# Patient Record
(demographics unavailable — no encounter records)

---

## 2017-09-13 NOTE — ED PDOC
HPI: Chest Pain


History Per: Patient (Patient is a 59 yo male with no significant PMHX 

presenting to ED with complaints of chest pain for the past month. He states 

that was recently admitted to [facility] 4 weeks ago for chest 

pain in which he was ultimately sent to the cath lab that revealed no 

abnormalities. He did not have any stents or cardiac procedures during his stay 

and was discharged on metropolol. Since then, his chest pain has persisted. He 

localizes the pain to his left anterior chest wall, non radiating, pressure-

like but sometimes sharp. Pain is sometimes brought on by physical activity but 

he also experiences the pain at rest as well. Pain is allleviated by aspirin. 4 

days ago, he states that while driving he experienced an episode of extreme 

chest pain associated with palpatations, dysarthria,  and numbness of his left 

arm and generalized weakness that resolved spontaneously within 15 minutes. 

When asked, he reports feeling very anxious and as per his daughter, he has a 

lot of stress in his life. He denies fever, cough, sob, LE swelling, dysuria, n/

v/d, abdominal pain, or weakness in extremitities. ), Family





<Ryan Willams - Last Filed: 09/13/17 17:49>





<Wade Aldana - Last Filed: 09/13/17 18:30>


Time Seen by Provider: 09/13/17 15:28


Chief Complaint (Nursing): Chest Pain





Supervising Attending Note





- Supervising Attending Note


The Documented history was done by the: Physician Extender, Attending Physician


The documented physical exam was done by the: Physician Extender, Attending 

Physician


The documented procedures were done by the: Physician Extender, Attending 

Physician





- Attestation:


I have personally seen and examined this patient.: Yes


I have fully participated in the care of the patient.: Yes


I have reviewed all pertinent clinical information: Yes





<Wade Aldana - Last Filed: 09/13/17 18:30>





Past Medical History





- Medical History


PMH: HTN





- Surgical History


Surgical History: Cholecystectomy





- Family History


Family History: States: Unknown Family Hx





- Living Arrangements


Living Arrangements: With Family





- Social History


Current smoker - smoking cessation education provided: No


Alcohol: None


Drugs: Denies





<Ryan Willams - Last Filed: 09/13/17 17:49>





<Wade Aldana - Last Filed: 09/13/17 18:30>


Vital Signs: 





 Last Vital Signs











Temp  98.0 F   09/13/17 15:17


 


Pulse  50 L  09/13/17 16:10


 


Resp  16   09/13/17 15:17


 


BP  145/72   09/13/17 16:10


 


Pulse Ox  98   09/13/17 17:50














- Home Medications


Home Medications: 


 Ambulatory Orders











 Medication  Instructions  Recorded


 


Aspirin [Ecotrin] 81 mg PO DAILY 09/13/17


 


Metoprolol Succinate [Toprol XL] 25 mg PO DAILY 09/13/17


 


Nabumetone [Relafen] 750 mg PO BID PRN 09/13/17


 


tiZANidine [Zanaflex] 2 mg PO DAILY PRN 09/13/17














- Allergies


Allergies/Adverse Reactions: 


 Allergies











Allergy/AdvReac Type Severity Reaction Status Date / Time


 


Penicillins Allergy  RASH Verified 07/01/16 20:50














BRENDA Risk Score for UA/NSTEMI





- BRENDA Risk Score


Age > 64: NO


3 or more CAD Risk Factors: NO


Known CAD (Stenosis greater than 50%): NO


Aspirin use in past 7 days: YES


Severe Angina: NO


EKG ST changes greater than 0.5mm: NO


Positive Cardiac Marker: NO


BRENDA Score: 1


Risk %: 5%





<Ryan Willams - Last Filed: 09/13/17 17:49>





Wells Criteria for PE





- Wells Criteria for Pulmonary Embolism


Clinical Signs and Symptoms of DVT: No


P.E is #1 Diagnosis, or Equally Likely: No


Heart Rate >100: No


Immobilization at least 3 days;Surgery previous 4 weeks: No


Previous, objectively diagnosed PE or DVT: No


Hemoptysis: No


Malignancy w/treatment within 6 months, or palliative: No


Total Score: 0





<Wade Aldana - Last Filed: 09/13/17 18:30>





Review of Systems


Constitutional: Negative for: Fever, Chills, Sweats


Cardiovascular: Positive for: Chest Pain, Palpitations.  Negative for: Orthopnea

, Paroxysmal Noc. Dyspnea, Edema, Light Headedness


Respiratory: Negative for: Cough, Shortness of Breath, Pleuritic Pain


Gastrointestinal: Negative for: Nausea, Vomiting, Abdominal Pain


Genitourinary Male: Negative for: Dysuria, Frequency


Musculoskeletal: Positive for: Shoulder Pain


Neurological: Positive for: Weakness (generalized), Numbness (Left arm and hand)





<Ryan Willams - Last Filed: 09/13/17 17:49>





Physical Exam





- Reviewed


Nursing Documentation Reviewed: Yes


Vital Signs Reviewed: Yes





- Physical Exam


Appears: Positive for: Well (Pt is seen lying in bed comfortable), Non-toxic, 

No Acute Distress


Head Exam: Positive for: ATRAUMATIC, NORMAL INSPECTION, NORMOCEPHALIC


Skin: Positive for: Normal Color, Warm, DRY


Eye Exam: Positive for: EOMI, Normal appearance, PERRL


ENT: Positive for: Normal ENT Inspection


Neck: Positive for: Normal, Painless ROM


Cardiovascular/Chest: Positive for: Regular Rate, Rhythm, Bradycardia, Other (

Focal tenderness to palpation- Left parasternal region ).  Negative for: Edema, 

JVD, Murmur


Respiratory: Positive for: Normal Breath Sounds.  Negative for: Accessory 

Muscle Use, Crackles, Rales, Wheezing


Pulses-Dorsalis Pedis (L): 2+


Pulses-Dorsalis Pedis (R): 2+


Pulses-Radial (L): 2+


Pulses-Radial (R): 2+


Gastrointestinal/Abdominal: Positive for: Normal Exam, Bowel Sounds, Soft.  

Negative for: Tenderness


Back: Positive for: Normal Inspection


Extremity: Positive for: Normal ROM.  Negative for: Pedal Edema, Swelling


Neurologic/Psych: Positive for: Alert, CNs II-XII, Oriented.  Negative for: 

Motor/Sensory Deficits, Facial Droop





<ImerRyan - Last Filed: 09/13/17 17:49>





- Laboratory Results


Result Diagrams: 


 09/13/17 16:10





 09/13/17 16:10





- ECG


O2 Sat by Pulse Oximetry: 98





<Ryan Willams - Last Filed: 09/13/17 17:49>





- Laboratory Results


Result Diagrams: 


 09/13/17 16:10





 09/13/17 16:10





<Wade Aldana A - Last Filed: 09/13/17 18:30>





Medical Decision Making





<Ryan Willams - Last Filed: 09/13/17 17:49>





<Wade Aldana A - Last Filed: 09/13/17 18:30>


Medical Decision Making: 








Pt is a 59 yo male with no PMHx presenting to ED with persistent chest pain for 

the past month. Pt recently had ACS ruled out via cath in [place] 4 weeks 

ago and was seen at the ED at [facility] 4 days ago for the same reason. 





Ed Course:





EKG: Sinus Bradycardia


Aspirin 244mg PO (pt took 81mg that morning)


Troponin


Cxray


CBC


BMP


Utox


 


 (Ryan Willams)





Disposition





- Patient ED Disposition


Is Patient to be Admitted: Yes





- Disposition


Disposition Time: 17:50





<Ryan Willams - Last Filed: 09/13/17 17:49>


Discussed With : Rene Peck


Counseled Patient/Family Regarding: Studies Performed, Diagnosis





- Pt Status Changed To:


Hospital Disposition Of: Observation





- POA


Present On Arrival: None





<Wade Aldana - Last Filed: 09/13/17 18:30>





- Clinical Impression


Clinical Impression: 


 Chest pain








- Disposition


Condition: STABLE

## 2017-09-13 NOTE — CT
PROCEDURE:  CT HEAD WITHOUT CONTRAST.



HISTORY:

Hx of Left arm weakness, Hx of Dysarthria



COMPARISON:

None available. 



TECHNIQUE:

Axial computed tomography images were obtained through the head/brain 

without intravenous contrast.  



Radiation dose:



Total exam DLP = 807 mGy-cm.



This CT exam was performed using one or more of the following dose 

reduction techniques: Automated exposure control, adjustment of the 

mA and/or kV according to patient size, and/or use of iterative 

reconstruction technique.



FINDINGS:



HEMORRHAGE:

No intracranial hemorrhage. 



BRAIN:

No mass effect or edema.  No atrophy or chronic microvascular 

ischemic changes.



VENTRICLES:

Unremarkable. No hydrocephalus. 



CALVARIUM:

Unremarkable.



PARANASAL SINUSES:

Unremarkable as visualized. No significant inflammatory changes.



MASTOID AIR CELLS:

Unremarkable as visualized. No inflammatory changes.



OTHER FINDINGS:

None.



IMPRESSION:

Normal CT of the Head.

## 2017-09-13 NOTE — RAD
PROCEDURE:  CHEST RADIOGRAPH, 1 VIEW



HISTORY:

chest pain



COMPARISON:

07/19/2010.



FINDINGS:



LUNGS:

Clear.



PLEURA:

No pneumothorax or pleural fluid seen.



CARDIOVASCULAR:

 No radiographic findings to suggest acute or significant 

cardiovascular disease.



OSSEOUS STRUCTURES:

No significant abnormalities.



VISUALIZED UPPER ABDOMEN:

Normal.



OTHER FINDINGS:

None. 



IMPRESSION:

No active disease. No acute/significant interval changes.

## 2017-09-13 NOTE — CP.PCM.HP
History of Present Illness





- History of Present Illness


History of Present Illness: 








CC/HPI: Pt. seen and examined in the E.D. Pt's daughter present. Pt. sent to 

the E.D. via ambulance from [facility] for evaluation of chest pain. Pt. 

states he had an episode of left sided chest pain while waiting to see his PMD 

Dr. Grant. Pt. states the pain resolved spontaneously after 15 minutes. Pt. 

states he has had previous episodes of non-exertional chest pain over the 

course of one month. Pt. states previous episode of chest pain occurred four 

days ago while he was driving lasted for 15 to 20 minutes subsequently patient 

pulled over to the side of the road and was taken to [facility] and 

subsequently discharged without any further intervention. Pt. reports this 

episode of chest pain was also associated with left arm numbness, weakness, 

slurred speech, and palpitations. Pt. also states that in  he had a 

cardiac cath at [facility] and was started on metoprolol succinate 25mg 

once daily with no cardiac intervention performed. Pt. at this time denies any 

weakness, slurred speech, headache, visual changes, incontinence, shortness of 

breath, or chest pain. Pt. also denies pleuritic chest pain, abdominal pain, 

nausea, vomiting, diarrhea, or joint pain. 





ROS: Pt. does report a mechanical fall that occurred approximately one and half 

month ago. Pt. reports he fall on his right shoulder. Pt. states he saw a 

doctor in [place], [place] and was prescribed Zanaflex and Relafen which has has 

been taking with some relief. Pt. states pain in RT. shoulder is aggravated by 

overhead movements and has been favoring his RT. arm (dominant arm) and using 

left arm more.





PMHx: HTN, Gout, Cardiac cath with normal findings at [facility](as per 

patient)


PSHx: Cholecystectomy, Left forearm fracture 


FMHx: Mother  due to Breast Cancer in [place]


ScHx: TOB, ETOH, DRUG None


         Works in construction


         Lives with Daughter and Wife, One daughter and One son live out of 

state





Allergies: PCN - Severe Hives 


Home/Current Meds: 











 Medication  Instructions  Recorded


 


Aspirin [Ecotrin] 81 mg PO DAILY 17


 


Metoprolol Succinate [Toprol XL] 25 mg PO DAILY 17


 


Nabumetone [Relafen] 750 mg PO BID PRN 17


 


tiZANidine [Zanaflex] 2 mg PO DAILY PRN 17








PMD: Dr. Niharika Grant at [facility]


Person to contact- Daughter Yari [phone]





E.D. Course 


EKG: Sinus Bradycardia


Aspirin 244mg PO (pt took 81mg this morning)


Troponin


Cxray


CBC


BMP


Utox








Present on Admission





- Present on Admission


Any Indicators Present on Admission: No


History of DVT/PE: No


History of Uncontrolled Diabetes: No


Urinary Catheter: No


Decubitus Ulcer Present: No





Review of Systems





- Review of Systems


Review of Systems: 





See HPI 





Past Patient History





- Infectious Disease


Hx of Infectious Diseases: None





- Past Social History


Alcohol: None


Drugs: Denies





- CARDIAC


Hx Hypertension: Yes





- PSYCHIATRIC


Hx Substance Use: No





- SURGICAL HISTORY


Hx Cholecystectomy: Yes





- ANESTHESIA


Hx Anesthesia: Yes





Meds


Allergies/Adverse Reactions: 


 Allergies











Allergy/AdvReac Type Severity Reaction Status Date / Time


 


Penicillins Allergy  RASH Verified 16 20:50














Physical Exam





- Constitutional


Appears: Non-toxic, No Acute Distress





- Head Exam


Head Exam: ATRAUMATIC, NORMOCEPHALIC





- Eye Exam


Eye Exam: Normal appearance.  absent: Scleral icterus





- ENT Exam


ENT Exam: Mucous Membranes Dry (Mild )





- Neck Exam


Neck exam: Positive for: Full Rom





- Respiratory Exam


Respiratory Exam: Clear to Auscultation Bilateral, NORMAL BREATHING PATTERN





- Cardiovascular Exam


Cardiovascular Exam: +S1, +S2.  absent: Systolic Murmur


Additional comments: 








+Positive Left chest wall tenderness 











- GI/Abdominal Exam


GI & Abdominal Exam: Normal Bowel Sounds, Soft.  absent: Tenderness





- Extremities Exam


Extremities exam: Positive for: normal inspection.  Negative for: calf 

tenderness


Additional comments: 








RT. upper extremity





+Pain on Abduction 30 to 40 degrees


+Drop Arm test 


+Pain on palpation of AC joint and Greater Tuberosity





Strength intact 5/5 bilat upper extremities 


Radial pulses +2/2 bilat upper extremities 





Sensation intact 











- Neurological Exam


Neurological exam: Alert, CN II-XII Intact, Oriented x3





- Psychiatric Exam


Psychiatric exam: Normal Affect, Normal Mood





Results





- Vital Signs


Recent Vital Signs: 





 Last Vital Signs











Temp  98.0 F   17 15:17


 


Pulse  50 L  17 16:10


 


Resp  16   17 15:17


 


BP  145/72   17 16:10


 


Pulse Ox  98   17 17:50














- Labs


Result Diagrams: 


 17 16:10





 17 16:10





Assessment & Plan





- Assessment and Plan (Free Text)


Assessment: 











58 y.o. male admitted for chest pain.








Recurrent Chest pain of unclear etiology which is reproducible on palpation, 

most likely non-cardiac, BRENDA score 1, Wells Criteria 0


1- Troponin x 1 negative, Trend Troponins


2- Continue ASA 81mg 


3- Start statin 20mg- Will get Lipid panel, HbA1c in the a.m. to calculate 

ASCVD 


4- Ibuprofen 600mg Mild Pain


5- Toradol 30mg IVP Moderate Pain


6- Morphine 2mg IVP Severe Pain


7- Will get urine metanephrine given patient's recurrent chest pain at rest





Sinus Bradycardia- Asymptomatic


1- Will hold Metoprololol





RT. Shoulder Pain of unclear etiology most likely Rotater cuff tear based on 

physical exam


1- Will get Rt. shoulder XR- Fall protocol


2- MRI as an outpatient 


3- NSAIDS PRN





BUn elevated- Most likely due to dehydration Specific gravity 1.020


1- N.S at 150 cc/hr x 1 liter


2- Repeat BMP 


3- Encourage PO hydration





Diet


1- Heart healthy





DVT prophylaxis


1- SCD for now

## 2017-09-14 NOTE — CP.PCM.PN
Subjective





- Date & Time of Evaluation


Date of Evaluation: 09/14/17


Time of Evaluation: 07:15





- Subjective


Subjective: 


Patient was seen and examined in telemetry unit this morning. 


Patient c/o right shoulder pain, but denies chest pain at this evaluation. 

Denies cough, chills, dizziness, headaches, N/V or other complains.


Patient reports that left lower chest pain episodes, are intermittent, x the 

last month, no radiating, last for 15 minutes, associated with exertion, relief 

without medications, associated with burning sensation in the middle of the 

chest. 


Afebrile, BP stable, but still bradycardic








Objective





- Vital Signs/Intake and Output


Vital Signs (last 24 hours): 


 











Temp Pulse Resp BP Pulse Ox


 


 97.8 F   50 L  20   134/82   96 


 


 09/14/17 08:05  09/14/17 08:05  09/14/17 08:05  09/14/17 08:05  09/14/17 08:05











- Medications


Medications: 


 Current Medications





Atorvastatin Calcium (Lipitor)  20 mg PO HS [facility]


Famotidine (Pepcid)  20 mg PO DAILY [facility]


   Last Admin: 09/14/17 10:42 Dose:  Not Given


Ibuprofen (Motrin Tab)  600 mg PO Q6 PRN


   PRN Reason: Pain, Mild (1-3)


Ketorolac Tromethamine (Toradol)  30 mg IVP Q6 PRN


   PRN Reason: Pain, moderate (4-7)


   Last Admin: 09/14/17 00:59 Dose:  30 mg


Morphine Sulfate (Morphine)  2 mg IVP Q6 PRN


   PRN Reason: Pain, severe (8-10)











- Labs


Labs: 


 





 09/14/17 04:45 











- Constitutional


Appears: No Acute Distress





- ENT Exam


ENT Exam: Mucous Membranes Moist





- Respiratory Exam


Respiratory Exam: Clear to Ausculation Bilateral, NORMAL BREATHING PATTERN.  

absent: Chest Wall Tenderness, Rales, Rhonchi, Wheezes, Respiratory Distress





- Cardiovascular Exam


Cardiovascular Exam: REGULAR RHYTHM, +S1, +S2





- GI/Abdominal Exam


GI & Abdominal Exam: Soft, Normal Bowel Sounds.  absent: Distended, Guarding, 

Rigid, Tenderness





- Extremities Exam


Extremities Exam: Normal Inspection.  absent: Calf Tenderness, Pedal Edema





- Neurological Exam


Neurological Exam: Alert, Awake, Oriented x3





- Skin


Skin Exam: Dry, Intact, Normal Color





Assessment and Plan





- Assessment and Plan (Free Text)


Assessment: 





57 y/o male with h/o recent diagnosed HTN ? admitted with chest pain to R/o ACS.





Plan: 





Left sided chest pain


after evaluation chest pain does not look like cardiac etiology, GERD vs 

musculoskeletal 


As per patient and his daughter recent cardiac cath ( 3 weeks ago) showed no 

blockage and no stents were placed.


-Troponin I x 2 negative


-EKG showed sinus bradycardia, no evidence of ST-T wave changes noted. done on 9 /13/17


-f/u urine metanephrine


-c/w aspirin 81 mg PO


-c/w statin


-c/w Heart healthy diet


-Pepcid 20 mg daily


-consider H. Pylori test


-Held Home metoprolol 


-f/u HgbA1C


-CVD risk 11.5 %





Bradycardia


-most likley 2/2 Metoprolol rate control effect


-Held Metoprolol for now





RT. Shoulder Pain 


of unclear etiology most likely Rotater cuff tear based on physical exam


Rt. shoulder XR showed normal finding, no evidence of Fx


MRI as an outpatient 


NSAIDs PRN





Azotemia


still BUN slightly elevated


Encourage PO hydration





DVT prophylaxis


 SCD for now

## 2017-09-14 NOTE — CP.PCM.DIS
Provider





- Provider


Date of Admission: 


09/13/17 16:44





Attending physician: 


Linnette Carver MD





Time Spent in preparation of Discharge (in minutes): 30





Diagnosis





- Discharge Diagnosis


(1) Chest pain


Status: Acute   


Comment: ACS was ruled out on admission. Chest pain free at this time. Could be 

2/2 GERD vs Gastritis with also possible musculoskeletal component 2/2 overuse 

of left UE. started on Famotidine. F/u with PMD in 1 week.   





(2) History of gout


Status: Chronic   


Comment: on no meds   





(3) Shoulder pain, right


Status: Chronic   


Comment: F/u  MRI. f/u with PMD.   





Hospital Course





- Lab Results


Lab Results: 


 Most Recent Lab Values











WBC  5.9 K/uL (4.8-10.8)   09/13/17  16:10    


 


RBC  5.12 Mil/uL (4.40-5.90)   09/13/17  16:10    


 


Hgb  15.1 g/dL (12.0-18.0)   09/13/17  16:10    


 


Hct  44.7 % (35.0-51.0)   09/13/17  16:10    


 


MCV  87.3 fl (80.0-94.0)   09/13/17  16:10    


 


MCH  29.5 pg (27.0-31.0)   09/13/17  16:10    


 


MCHC  33.7 g/dL (33.0-37.0)   09/13/17  16:10    


 


RDW  13.4 % (11.5-14.5)   09/13/17  16:10    


 


Plt Count  170 K/uL (130-400)   09/13/17  16:10    


 


MPV  10.6 fl (7.2-11.7)   09/13/17  16:10    


 


Neut % (Auto)  53.1 % (50.0-75.0)   09/13/17  16:10    


 


Lymph % (Auto)  36.3 % (20.0-40.0)   09/13/17  16:10    


 


Mono % (Auto)  9.0 % (0.0-10.0)   09/13/17  16:10    


 


Eos % (Auto)  1.2 % (0.0-4.0)   09/13/17  16:10    


 


Baso % (Auto)  0.4 % (0.0-2.0)   09/13/17  16:10    


 


Neut #  3.1 K/uL (1.8-7.0)   09/13/17  16:10    


 


Lymph #  2.1 K/uL (1.0-4.3)   09/13/17  16:10    


 


Mono #  0.5 K/uL (0.0-0.8)   09/13/17  16:10    


 


Eos #  0.1 K/uL (0.0-0.7)   09/13/17  16:10    


 


Baso #  0.0 K/uL (0.0-0.2)   09/13/17  16:10    


 


Sodium  140 mmol/l (132-148)   09/14/17  04:45    


 


Potassium  4.0 MMOL/L (3.6-5.0)   09/14/17  04:45    


 


Chloride  106 mmol/L ()   09/14/17  04:45    


 


Carbon Dioxide  24 mmol/L (22-30)   09/14/17  04:45    


 


Anion Gap  14  (10-20)   09/14/17  04:45    


 


BUN  25 mg/dl (9-20)  H  09/14/17  04:45    


 


Creatinine  0.7 mg/dL (0.8-1.5)  L  09/14/17  04:45    


 


Est GFR ( Amer)  > 60   09/14/17  04:45    


 


Est GFR (Non-Af Amer)  > 60   09/14/17  04:45    


 


Random Glucose  86 mg/dL ()   09/14/17  04:45    


 


Calcium  9.0 mg/dL (8.4-10.2)   09/14/17  04:45    


 


Troponin I  < 0.0120 ng/mL (0.00-0.120)   09/14/17  01:10    


 


Triglycerides  131 mg/DL (0-149)   09/14/17  04:45    


 


Cholesterol  177 mg/dL (0-199)   09/14/17  04:45    


 


LDL Cholesterol Direct  121 mg/dL (0-129)   09/14/17  04:45    


 


HDL Cholesterol  35 MG/DL (30-70)   09/14/17  04:45    


 


Urine Color  Yellow  (YELLOW)   09/13/17  20:17    


 


Urine Clarity  Slighty-cloudy  (Clear)   09/13/17  20:17    


 


Urine pH  6.0  (5.0-8.0)   09/13/17  20:17    


 


Ur Specific Gravity  1.020  (1.003-1.030)   09/13/17  20:17    


 


Urine Protein  Negative mg/dL (NEGATIVE)   09/13/17  20:17    


 


Urine Glucose (UA)  Neg mg/dL (Normal)   09/13/17  20:17    


 


Urine Ketones  Negative mg/dL (NEGATIVE)   09/13/17  20:17    


 


Urine Blood  Negative  (NEGATIVE)   09/13/17  20:17    


 


Urine Nitrate  Negative  (NEGATIVE)   09/13/17  20:17    


 


Urine Bilirubin  Negative  (NEGATIVE)   09/13/17  20:17    


 


Urine Urobilinogen  0.2-1.0 mg/dL (0.2-1.0)   09/13/17  20:17    


 


Ur Leukocyte Esterase  Neg Alfredito/uL (Negative)   09/13/17  20:17    


 


Urine RBC (Auto)  1 /hpf (0-3)   09/13/17  20:17    


 


Urine Microscopic WBC  1 /hpf (0-5)   09/13/17  20:17    


 


Urine Opiates Screen  Negative  (NEGATIVE)   09/13/17  20:17    


 


Urine Methadone Screen  Negative  (NEGATIVE)   09/13/17  20:17    


 


Ur Barbiturates Screen  Negative  (NEGATIVE)   09/13/17  20:17    


 


Ur Phencyclidine Scrn  Negative  (NEGATIVE)   09/13/17  20:17    


 


Ur Amphetamines Screen  Negative  (NEGATIVE)   09/13/17  20:17    


 


U Benzodiazepines Scrn  Negative  (NEGATIVE)   09/13/17  20:17    


 


U Oth Cocaine Metabols  Negative  (NEGATIVE)   09/13/17  20:17    


 


U Cannabinoids Screen  Negative  (NEGATIVE)   09/13/17  20:17    














- Hospital Course


Hospital Course: 


59 y/o M with PMHx of recent diagnosed HTN ?(as per patient and family report), 

Gout who presented yesterday to ED c/o left sided chest pain and was admitted 

to r/o ACS. EKG done and showed Sinus bradycardia. CXR showed no active 

disease. Troponin x 2 were negative. Patient was managed with pain control 

medications and antacid. Patient's symptoms resolved during admission. ACS was 

ruled out, but because CVD risk 11.5 % we started him on statin. We also 

discontinued Metoprolol because sinus bradycardia, and possible associated with 

previous reported dizziness episode. Head CT on admission was unremarkable. We 

started Pepcid on this admission to manage possible GERD vs gastritis. We also 

discontinued Nabumetone and Zanaflex, and we started Lidoderm patch and tylenol 

OTC to manage chronic right shoulder pain, and will f/u with MRI as outpatient. 

R shoulder x ray was unremarkable during admission. f/u with PMD in 1 week at 

[facility]. F/u Metanephrines in urine. 








Home medications


Atorvastatin 20 mg HS daily started on admission


aspirin 81 mg PO daily


Famotidine 20 mg BID PO started on admission


Lidoderm patch 5 % PRN for R shoulder pain. started on this admission





Stopped on this admission


Metoprolol XL 25 mg daily


Nabumetone NSAID


Zanaflex   muscle relaxant





- Date & Time of H&P


Date of H&P: 09/13/17


Time of H&P: 19:30





Discharge Exam





- Head Exam


Head Exam: ATRAUMATIC, NORMOCEPHALIC





- ENT Exam


ENT Exam: Mucous Membranes Moist





- Respiratory Exam


Respiratory Exam: Clear to PA & Lateral, NORMAL BREATHING PATTERN.  absent: 

Chest Wall Tenderness, Decreased Breath Sounds, Rales, Rhonchi, Wheezes, 

Respiratory Distress





- Cardiovascular Exam


Cardiovascular Exam: REGULAR RHYTHM, +S1, +S2





- GI/Abdominal Exam


GI & Abdominal Exam: Normal Bowel Sounds, Soft.  absent: Distended, Guarding, 

Tenderness





- Extremities Exam


Extremities exam: normal inspection


Additional comments: 





no edema in lower extremities, no calves tenderness noted





- Neurological Exam


Neurological exam: Alert, Oriented x3





- Psychiatric Exam


Psychiatric exam: Normal Affect, Normal Mood





- Skin


Skin Exam: Dry, Intact, Normal Color





Discharge Plan





- Discharge Medications


Prescriptions: 


Atorvastatin [Lipitor] 20 mg PO HS #30 tab


Famotidine [Heartburn Prevention] 20 mg PO BID #60 tablet


Lidocaine 5% [Lidoderm] 1 ea TD DAILY #30 patch





- Follow Up Plan


Condition: GOOD


Disposition: HOME/ ROUTINE


Patient education suggested?: Yes


Additional Instructions: 


f/u with PMD in 1 week at [facility] on September 19, 2017 at 3:30 pm.

## 2017-09-14 NOTE — CARD
--------------- APPROVED REPORT --------------





EKG Measurement

Heart [id number]

NM [id number]

[id number]

[id number]

[id number]



<Conclusion>

Sinus bradycardia

Otherwise normal ECG

## 2017-09-14 NOTE — RAD
PROCEDURE:  Radiographs of the right humerus.



HISTORY:

Fall approximatley 4 to 6 weeks ago/Pesistent Pain



COMPARISON:

None.



FINDINGS:



BONES:

Normal. No fracture or focal lesion. 



SOFT TISSUES:

Normal.



OTHER FINDINGS:

None.



IMPRESSION:

Normal radiographs of right humerus.